# Patient Record
Sex: FEMALE | HISPANIC OR LATINO
[De-identification: names, ages, dates, MRNs, and addresses within clinical notes are randomized per-mention and may not be internally consistent; named-entity substitution may affect disease eponyms.]

---

## 2018-09-26 ENCOUNTER — CHARTING TRANS (OUTPATIENT)
Dept: OTHER | Age: 63
End: 2018-09-26

## 2018-10-02 ENCOUNTER — CHARTING TRANS (OUTPATIENT)
Dept: OTHER | Age: 63
End: 2018-10-02

## 2018-11-23 ENCOUNTER — IMAGING SERVICES (OUTPATIENT)
Dept: OTHER | Age: 63
End: 2018-11-23

## 2020-01-07 ENCOUNTER — OFFICE VISIT (OUTPATIENT)
Dept: NEUROLOGY | Facility: CLINIC | Age: 65
End: 2020-01-07
Payer: MEDICARE

## 2020-01-07 VITALS
WEIGHT: 111 LBS | SYSTOLIC BLOOD PRESSURE: 110 MMHG | RESPIRATION RATE: 16 BRPM | HEART RATE: 76 BPM | DIASTOLIC BLOOD PRESSURE: 72 MMHG | BODY MASS INDEX: 21 KG/M2

## 2020-01-07 DIAGNOSIS — R42 DIZZINESS: Primary | ICD-10-CM

## 2020-01-07 PROCEDURE — 99204 OFFICE O/P NEW MOD 45 MIN: CPT | Performed by: OTHER

## 2020-01-07 RX ORDER — MECLIZINE HCL 12.5 MG/1
12.5 TABLET ORAL 2 TIMES DAILY PRN
Qty: 40 TABLET | Refills: 0 | Status: SHIPPED | OUTPATIENT
Start: 2020-01-07

## 2020-01-07 NOTE — PROGRESS NOTES
[facility]    Date of consult: 1/7/2020    CC: paresthesia, dizziness    HPI: Rhys Henderson is a 75year old female with past medical history as listed below presents here for initial evaluation of dizziness and paresthesia.  Marie Rodriguez History   Problem Relation Age of Onset   • Cancer Father       Physical Examination:  /72   Pulse 76   Resp 16   Wt 111 lb (50.3 kg)   BMI 20.97 kg/m²   Lungs: clear to auscultation   CV: S1, S2+  Abdomen: soft, non tender, no masses  Extremities: n

## 2020-01-07 NOTE — PROGRESS NOTES
Patient here for evaluation of dizziness. Does also have nausea and blurred vision. Dizziness started approx 5-6 days ago.      [employer] phone  used: Lala President [phone]

## 2020-01-14 ENCOUNTER — APPOINTMENT (OUTPATIENT)
Dept: PHYSICAL THERAPY | Age: 65
End: 2020-01-14
Attending: Other
Payer: MEDICARE

## 2020-01-20 ENCOUNTER — APPOINTMENT (OUTPATIENT)
Dept: PHYSICAL THERAPY | Age: 65
End: 2020-01-20
Attending: Other
Payer: MEDICARE

## 2020-01-21 ENCOUNTER — APPOINTMENT (OUTPATIENT)
Dept: PHYSICAL THERAPY | Age: 65
End: 2020-01-21
Attending: Other
Payer: MEDICARE

## 2020-01-21 NOTE — TELEPHONE ENCOUNTER
Used [employer] for  on phone call. Jari Cooks BT#[id number]    Relayed below and to not drive to/from procedure. Verbalized understanding. Faxed Rx to pharmacy. Confirmation received.

## 2020-01-21 NOTE — TELEPHONE ENCOUNTER
Pt called, used ; pt states she was unable to complete MRI due to claustophobia; pt requesting MRI w/sedation, please call when pt is able to schedule

## 2020-01-31 ENCOUNTER — HOSPITAL ENCOUNTER (OUTPATIENT)
Dept: MRI IMAGING | Age: 65
Discharge: HOME | End: 2020-01-31
Attending: Other
Payer: MEDICARE

## 2020-01-31 PROCEDURE — A9575 INJ GADOTERATE MEGLUMI 0.1ML: HCPCS | Performed by: OTHER

## 2020-01-31 PROCEDURE — 70553 MRI BRAIN STEM W/O & W/DYE: CPT | Performed by: OTHER

## 2020-02-03 ENCOUNTER — TELEPHONE (OUTPATIENT)
Dept: NEUROLOGY | Facility: CLINIC | Age: 65
End: 2020-02-03

## 2020-02-03 NOTE — TELEPHONE ENCOUNTER
Patient notified of Pricila Ortega results and reminded of 2/14 deana't . Patient verbalized understanding.

## 2020-02-10 ENCOUNTER — IMAGING SERVICES (OUTPATIENT)
Dept: OTHER | Age: 65
End: 2020-02-10

## 2020-02-14 ENCOUNTER — OFFICE VISIT (OUTPATIENT)
Dept: NEUROLOGY | Facility: CLINIC | Age: 65
End: 2020-02-14
Payer: MEDICARE

## 2020-02-14 VITALS
WEIGHT: 110 LBS | RESPIRATION RATE: 16 BRPM | SYSTOLIC BLOOD PRESSURE: 102 MMHG | DIASTOLIC BLOOD PRESSURE: 70 MMHG | BODY MASS INDEX: 21 KG/M2 | HEART RATE: 74 BPM

## 2020-02-14 DIAGNOSIS — R42 DIZZINESS: Primary | ICD-10-CM

## 2020-02-14 PROCEDURE — 99214 OFFICE O/P EST MOD 30 MIN: CPT | Performed by: OTHER

## 2020-02-14 NOTE — PROGRESS NOTES
Pt following up for dizziness and to review test results. Pt states she doesn't really get dizzy anymore or have blurred vision.       ID:   [id number]

## 2020-02-14 NOTE — PROGRESS NOTES
[facility] Neurology outpatient progress note  Date of service: 2/14/2020    Patient here for a follow-up visit for dizziness. Since last visit she feels better, some mild discomfort in in right ear reported;   She is happy to know MRI brain is normal.  No other c • Fibromyalgia    • Gastritis      Past Surgical History:   Procedure Laterality Date   • LAPAROSCOPIC CHOLECYSTECTOMY N/A 5/25/2016    Performed by Bridgette Julio MD at [facility] MAIN OR   • OTHER      tummy tuck   • OTHER      implant in mouth   • OTHER      ri

## 2021-06-25 ENCOUNTER — IMAGING SERVICES (OUTPATIENT)
Dept: OTHER | Age: 66
End: 2021-06-25

## 2022-12-07 ENCOUNTER — WALK IN (OUTPATIENT)
Dept: URGENT CARE | Age: 67
End: 2022-12-07

## 2022-12-07 VITALS
HEART RATE: 83 BPM | DIASTOLIC BLOOD PRESSURE: 62 MMHG | OXYGEN SATURATION: 98 % | TEMPERATURE: 97.2 F | RESPIRATION RATE: 18 BRPM | SYSTOLIC BLOOD PRESSURE: 120 MMHG

## 2022-12-07 DIAGNOSIS — H61.21 IMPACTED CERUMEN OF RIGHT EAR: ICD-10-CM

## 2022-12-07 DIAGNOSIS — R42 VERTIGO: Primary | ICD-10-CM

## 2022-12-07 PROCEDURE — 69209 REMOVE IMPACTED EAR WAX UNI: CPT | Performed by: FAMILY MEDICINE

## 2022-12-07 PROCEDURE — 99203 OFFICE O/P NEW LOW 30 MIN: CPT | Performed by: FAMILY MEDICINE

## 2022-12-07 RX ORDER — MECLIZINE HYDROCHLORIDE 25 MG/1
25 TABLET ORAL 3 TIMES DAILY PRN
Qty: 30 TABLET | Refills: 0 | Status: SHIPPED | OUTPATIENT
Start: 2022-12-07

## 2023-05-27 ENCOUNTER — APPOINTMENT (OUTPATIENT)
Dept: ULTRASOUND IMAGING | Age: 68
End: 2023-05-27
Attending: INTERNAL MEDICINE

## 2023-07-27 ENCOUNTER — OFFICE VISIT (OUTPATIENT)
Dept: GASTROENTEROLOGY | Age: 68
End: 2023-07-27

## 2023-07-27 ENCOUNTER — E-ADVICE (OUTPATIENT)
Dept: GASTROENTEROLOGY | Age: 68
End: 2023-07-27

## 2023-07-27 VITALS — WEIGHT: 106.7 LBS | BODY MASS INDEX: 20.15 KG/M2 | HEIGHT: 61 IN

## 2023-07-27 DIAGNOSIS — R10.9 ABDOMINAL PAIN, UNSPECIFIED ABDOMINAL LOCATION: ICD-10-CM

## 2023-07-27 DIAGNOSIS — K21.9 GERD WITHOUT ESOPHAGITIS: Primary | ICD-10-CM

## 2023-07-27 PROCEDURE — 99204 OFFICE O/P NEW MOD 45 MIN: CPT

## 2023-07-27 RX ORDER — FAMOTIDINE 40 MG/1
40 TABLET, FILM COATED ORAL 2 TIMES DAILY
Qty: 180 TABLET | Refills: 3 | Status: SHIPPED | OUTPATIENT
Start: 2023-07-27 | End: 2023-10-25

## 2023-08-15 ENCOUNTER — ANESTHESIA EVENT (OUTPATIENT)
Dept: GASTROENTEROLOGY | Age: 68
End: 2023-08-15

## 2023-08-15 ENCOUNTER — HOSPITAL ENCOUNTER (OUTPATIENT)
Dept: GASTROENTEROLOGY | Age: 68
Discharge: HOME | End: 2023-08-15
Attending: INTERNAL MEDICINE

## 2023-08-15 ENCOUNTER — ANESTHESIA (OUTPATIENT)
Dept: GASTROENTEROLOGY | Age: 68
End: 2023-08-15

## 2023-08-15 VITALS
OXYGEN SATURATION: 98 % | HEIGHT: 61 IN | BODY MASS INDEX: 20.58 KG/M2 | HEART RATE: 54 BPM | SYSTOLIC BLOOD PRESSURE: 126 MMHG | DIASTOLIC BLOOD PRESSURE: 86 MMHG | RESPIRATION RATE: 18 BRPM | WEIGHT: 109 LBS | TEMPERATURE: 97.5 F

## 2023-08-15 DIAGNOSIS — K29.70 GASTRITIS WITHOUT BLEEDING, UNSPECIFIED CHRONICITY, UNSPECIFIED GASTRITIS TYPE: ICD-10-CM

## 2023-08-15 DIAGNOSIS — K21.9 GERD WITHOUT ESOPHAGITIS: ICD-10-CM

## 2023-08-15 DIAGNOSIS — R10.9 ABDOMINAL PAIN, UNSPECIFIED ABDOMINAL LOCATION: ICD-10-CM

## 2023-08-15 PROCEDURE — 88305 TISSUE EXAM BY PATHOLOGIST: CPT | Performed by: INTERNAL MEDICINE

## 2023-08-15 PROCEDURE — 43239 EGD BIOPSY SINGLE/MULTIPLE: CPT | Performed by: INTERNAL MEDICINE

## 2023-08-15 PROCEDURE — 43239 EGD BIOPSY SINGLE/MULTIPLE: CPT | Performed by: CLINIC/CENTER

## 2023-08-15 RX ORDER — 0.9 % SODIUM CHLORIDE 0.9 %
2 VIAL (ML) INJECTION EVERY 12 HOURS SCHEDULED
Status: DISCONTINUED | OUTPATIENT
Start: 2023-08-15 | End: 2023-08-17 | Stop reason: HOSPADM

## 2023-08-15 RX ORDER — SODIUM CHLORIDE 9 MG/ML
INJECTION, SOLUTION INTRAVENOUS CONTINUOUS
Status: DISCONTINUED | OUTPATIENT
Start: 2023-08-15 | End: 2023-08-17 | Stop reason: HOSPADM

## 2023-08-15 RX ORDER — DEXTROSE MONOHYDRATE 50 MG/ML
INJECTION, SOLUTION INTRAVENOUS CONTINUOUS PRN
Status: DISCONTINUED | OUTPATIENT
Start: 2023-08-15 | End: 2023-08-17 | Stop reason: HOSPADM

## 2023-08-15 RX ORDER — PROPOFOL 10 MG/ML
INJECTION, EMULSION INTRAVENOUS PRN
Status: DISCONTINUED | OUTPATIENT
Start: 2023-08-15 | End: 2023-08-15

## 2023-08-15 RX ORDER — DEXTROSE MONOHYDRATE 25 G/50ML
25 INJECTION, SOLUTION INTRAVENOUS PRN
Status: DISCONTINUED | OUTPATIENT
Start: 2023-08-15 | End: 2023-08-17 | Stop reason: HOSPADM

## 2023-08-15 RX ORDER — LIDOCAINE HYDROCHLORIDE 10 MG/ML
INJECTION, SOLUTION INFILTRATION; PERINEURAL PRN
Status: DISCONTINUED | OUTPATIENT
Start: 2023-08-15 | End: 2023-08-15

## 2023-08-15 RX ORDER — SODIUM CHLORIDE, SODIUM LACTATE, POTASSIUM CHLORIDE, CALCIUM CHLORIDE 600; 310; 30; 20 MG/100ML; MG/100ML; MG/100ML; MG/100ML
INJECTION, SOLUTION INTRAVENOUS CONTINUOUS
Status: DISCONTINUED | OUTPATIENT
Start: 2023-08-15 | End: 2023-08-17 | Stop reason: HOSPADM

## 2023-08-15 RX ORDER — NICOTINE POLACRILEX 4 MG
30 LOZENGE BUCCAL
Status: DISCONTINUED | OUTPATIENT
Start: 2023-08-15 | End: 2023-08-17 | Stop reason: HOSPADM

## 2023-08-15 RX ORDER — HUMAN INSULIN 100 [IU]/ML
INJECTION, SOLUTION SUBCUTANEOUS
Status: DISCONTINUED | OUTPATIENT
Start: 2023-08-15 | End: 2023-08-17 | Stop reason: HOSPADM

## 2023-08-15 RX ADMIN — PROPOFOL 20 MG: 10 INJECTION, EMULSION INTRAVENOUS at 13:44

## 2023-08-15 RX ADMIN — PROPOFOL 30 MG: 10 INJECTION, EMULSION INTRAVENOUS at 13:40

## 2023-08-15 RX ADMIN — PROPOFOL 20 MG: 10 INJECTION, EMULSION INTRAVENOUS at 13:42

## 2023-08-15 RX ADMIN — LIDOCAINE HYDROCHLORIDE 30 MG: 10 INJECTION, SOLUTION INFILTRATION; PERINEURAL at 13:38

## 2023-08-15 RX ADMIN — SODIUM CHLORIDE, SODIUM LACTATE, POTASSIUM CHLORIDE, CALCIUM CHLORIDE: 600; 310; 30; 20 INJECTION, SOLUTION INTRAVENOUS at 12:57

## 2023-08-15 RX ADMIN — PROPOFOL 70 MG: 10 INJECTION, EMULSION INTRAVENOUS at 13:38

## 2023-08-15 SDOH — SOCIAL STABILITY: SOCIAL INSECURITY: RISK FACTORS: AGE

## 2023-08-15 ASSESSMENT — ACTIVITIES OF DAILY LIVING (ADL)
ADL_BEFORE_ADMISSION: INDEPENDENT
ADL_SCORE: 12

## 2023-08-15 ASSESSMENT — PAIN SCALES - GENERAL
PAINLEVEL_OUTOF10: 1
PAINLEVEL_OUTOF10: 0

## 2023-08-17 ENCOUNTER — TELEPHONE (OUTPATIENT)
Dept: GASTROENTEROLOGY | Age: 68
End: 2023-08-17

## 2023-08-17 ENCOUNTER — CLINICAL DOCUMENTATION (OUTPATIENT)
Dept: GASTROENTEROLOGY | Age: 68
End: 2023-08-17

## 2023-08-17 LAB
ASR DISCLAIMER: NORMAL
CASE RPRT: NORMAL
CLINICAL INFO: NORMAL
PATH REPORT.FINAL DX SPEC: NORMAL
PATH REPORT.GROSS SPEC: NORMAL

## 2023-09-20 ENCOUNTER — WALK IN (OUTPATIENT)
Dept: URGENT CARE | Age: 68
End: 2023-09-20

## 2023-09-20 VITALS
RESPIRATION RATE: 18 BRPM | DIASTOLIC BLOOD PRESSURE: 76 MMHG | OXYGEN SATURATION: 100 % | SYSTOLIC BLOOD PRESSURE: 112 MMHG | TEMPERATURE: 97.4 F | HEART RATE: 84 BPM

## 2023-09-20 DIAGNOSIS — M43.6 TORTICOLLIS, ACUTE: ICD-10-CM

## 2023-09-20 DIAGNOSIS — K21.9 GASTROESOPHAGEAL REFLUX DISEASE, UNSPECIFIED WHETHER ESOPHAGITIS PRESENT: Primary | ICD-10-CM

## 2023-09-20 DIAGNOSIS — K21.9 GERD WITHOUT ESOPHAGITIS: ICD-10-CM

## 2023-09-20 PROCEDURE — 99215 OFFICE O/P EST HI 40 MIN: CPT | Performed by: EMERGENCY MEDICINE

## 2023-09-20 PROCEDURE — 93000 ELECTROCARDIOGRAM COMPLETE: CPT | Performed by: EMERGENCY MEDICINE

## 2023-09-20 RX ORDER — PREDNISONE 20 MG/1
40 TABLET ORAL DAILY
Qty: 10 TABLET | Refills: 0 | Status: SHIPPED | OUTPATIENT
Start: 2023-09-20 | End: 2023-09-25

## 2023-09-20 RX ORDER — OMEPRAZOLE 20 MG/1
20 CAPSULE, DELAYED RELEASE ORAL DAILY
Qty: 90 CAPSULE | Refills: 3 | Status: SHIPPED | OUTPATIENT
Start: 2023-09-20

## 2023-10-31 ENCOUNTER — OFFICE VISIT (OUTPATIENT)
Facility: LOCATION | Age: 68
End: 2023-10-31

## 2023-10-31 DIAGNOSIS — H93.13 TINNITUS OF BOTH EARS: ICD-10-CM

## 2023-10-31 DIAGNOSIS — J34.89 NASAL VESTIBULITIS: ICD-10-CM

## 2023-10-31 DIAGNOSIS — L30.9 DERMATITIS OF EXTERNAL EAR: Primary | ICD-10-CM

## 2023-10-31 RX ORDER — FLUOCINOLONE ACETONIDE 0.11 MG/ML
3 OIL AURICULAR (OTIC) 3 TIMES DAILY
Qty: 20 ML | Refills: 3 | Status: SHIPPED | OUTPATIENT
Start: 2023-10-31 | End: 2023-11-07

## 2023-10-31 NOTE — PROGRESS NOTES
Sofi Haider is a 77year old female. No chief complaint on file. HPI:   She complains of itching in her ears. She also gets tinnitus. She also has a history of dryness in the nose. Current Outpatient Medications   Medication Sig Dispense Refill    Fluocinolone Acetonide 0.01 % Otic Oil Place 3 drops in ear(s) 3 (three) times daily for 7 days. Into affected ear 20 mL 3    LORazepam 1 MG Oral Tab Take 1 tablet (1mg) 1 hour prior to MRI and may repeat once right before MRI if needed. NO DRIVING WHILE TAKING THIS MEDICATION. 2 tablet 0    Meclizine HCl 12.5 MG Oral Tab Take 1 tablet (12.5 mg total) by mouth 2 (two) times daily as needed for Dizziness. 40 tablet 0    omeprazole 20 MG Oral Capsule Delayed Release Take 20 mg by mouth every morning before breakfast.      Pantoprazole Sodium 40 MG Oral Tab EC Take 40 mg by mouth 2 (two) times daily before meals. Atorvastatin Calcium 20 MG Oral Tab Take 20 mg by mouth nightly. Budesonide-Formoterol Fumarate 160-4.5 MCG/ACT Inhalation Aerosol Inhale 1 puff into the lungs daily. Albuterol Sulfate  (90 BASE) MCG/ACT Inhalation Aero Soln Inhale 2 puffs into the lungs every 6 (six) hours as needed for Wheezing.         Past Medical History:   Diagnosis Date    Asthma     Depression     Fibromyalgia     Gastritis       Social History:  Social History     Socioeconomic History    Marital status: Legally    Tobacco Use    Smoking status: Never    Smokeless tobacco: Never   Vaping Use    Vaping Use: Never used   Substance and Sexual Activity    Alcohol use: Yes     Comment: socially    Drug use: Not Currently   Other Topics Concern    Caffeine Concern Yes     Comment: coffee/tea 2 per day    Exercise Yes     Comment: walking, stretching daily      Past Surgical History:   Procedure Laterality Date    LAPAROSCOPIC CHOLECYSTECTOMY N/A 5/25/2016    Procedure: Talib Magaña;  Surgeon: Eddie Glasgow MD;  Location: [facility] MAIN OR    OTHER tummy tuck    OTHER      implant in mouth    OTHER      right ear surgery    TUBAL LIGATION           REVIEW OF SYSTEMS:   GENERAL HEALTH: feels well otherwise  GENERAL : denies fever, chills, sweats, weight loss, weight gain  SKIN: denies any unusual skin lesions or rashes  RESPIRATORY: denies shortness of breath with exertion  NEURO: denies headaches    EXAM:   There were no vitals taken for this visit. System Findings Details   Constitutional  Overall appearance - Normal.   Psychiatric  Orientation - Oriented to time, place, person & situation. Appropriate mood and affect. Head/Face  Facial features -- Normal. Skull - Normal.   Eyes  Pupils equal ,round ,react to light and accomidate   Ears, Nose, Throat, Neck  Dermatitis of the ears bilaterally cleaned out today under the microscope nose reveals some mild nasal vestibulitis oropharynx clear neck no masses   Neurological  Memory - Normal. Cranial nerves - Cranial nerves II through XII grossly intact. Lymph Detail  Submental. Submandibular. Anterior cervical. Posterior cervical. Supraclavicular. ASSESSMENT AND PLAN:   1. Dermatitis of external ear  She will try Derm otic drops as needed for itching. 2. Nasal vestibulitis  She will try saline nasal gel as needed. 3. Tinnitus of both ears  She will see me back with a dedicated audiogram.      The patient indicates understanding of these issues and agrees to the plan. No follow-ups on file.     Zohra Allison MD  10/31/2023  4:56 PM

## 2023-11-08 PROBLEM — K44.9 HIATAL HERNIA: Status: ACTIVE | Noted: 2018-09-25

## 2023-11-08 PROBLEM — R31.29 OTHER MICROSCOPIC HEMATURIA: Status: ACTIVE | Noted: 2018-01-09

## 2023-11-08 PROBLEM — Z12.39 OTHER SCREENING BREAST EXAMINATION: Status: ACTIVE | Noted: 2017-06-15

## 2023-11-08 PROBLEM — K58.9 IBS (IRRITABLE BOWEL SYNDROME): Status: ACTIVE | Noted: 2022-02-24

## 2023-11-08 PROBLEM — M79.672 PAIN OF LEFT HEEL: Status: ACTIVE | Noted: 2022-08-01

## 2023-11-08 PROBLEM — R10.31 ABDOMINAL PAIN, RIGHT LOWER QUADRANT: Status: ACTIVE | Noted: 2018-09-11

## 2023-11-08 PROBLEM — H91.90 DECREASED HEARING: Status: ACTIVE | Noted: 2019-08-09

## 2023-11-08 PROBLEM — R23.3 EASY BRUISING: Status: ACTIVE | Noted: 2017-12-01

## 2023-11-08 PROBLEM — H57.9 EYE SYMPTOMS: Status: ACTIVE | Noted: 2019-12-05

## 2023-11-08 PROBLEM — Z00.00 HEALTHCARE MAINTENANCE: Status: ACTIVE | Noted: 2021-10-13

## 2023-11-08 PROBLEM — R60.0 PEDAL EDEMA: Status: ACTIVE | Noted: 2021-07-19

## 2023-11-08 PROBLEM — M62.830 SPASM OF BACK MUSCLES: Status: ACTIVE | Noted: 2022-05-04

## 2023-11-08 PROBLEM — R92.8 ABNORMAL MAMMOGRAM: Status: ACTIVE | Noted: 2022-09-16

## 2023-11-08 PROBLEM — E78.5 DYSLIPIDEMIA: Status: ACTIVE | Noted: 2017-06-15

## 2023-11-08 PROBLEM — M54.50 LOW BACK PAIN: Status: ACTIVE | Noted: 2017-06-09

## 2023-11-08 PROBLEM — E78.00 HYPERCHOLESTEREMIA: Status: ACTIVE | Noted: 2019-05-23

## 2023-11-08 PROBLEM — K21.9 GERD (GASTROESOPHAGEAL REFLUX DISEASE): Status: ACTIVE | Noted: 2019-05-23

## 2023-11-08 PROBLEM — F41.0 PANIC DISORDER: Status: ACTIVE | Noted: 2019-11-07

## 2023-11-08 PROBLEM — H91.90 HEARING LOSS: Status: ACTIVE | Noted: 2017-05-25

## 2023-11-08 PROBLEM — G62.9 NEUROPATHY: Status: ACTIVE | Noted: 2018-11-08

## 2023-11-08 PROBLEM — R53.81 MALAISE AND FATIGUE: Status: ACTIVE | Noted: 2017-06-09

## 2023-11-08 PROBLEM — M54.2 NECK PAIN: Status: ACTIVE | Noted: 2021-07-02

## 2023-11-08 PROBLEM — Z12.31 OTHER SCREENING MAMMOGRAM: Status: ACTIVE | Noted: 2022-08-19

## 2023-11-08 PROBLEM — I73.9 PERIPHERAL VASCULAR DISEASE (CMD): Status: ACTIVE | Noted: 2017-06-05

## 2023-11-08 PROBLEM — R10.2 PELVIC PAIN: Status: ACTIVE | Noted: 2022-08-19

## 2023-11-08 PROBLEM — R73.02 IMPAIRED GLUCOSE TOLERANCE: Status: ACTIVE | Noted: 2022-09-02

## 2023-11-08 PROBLEM — H81.10 BENIGN PAROXYSMAL POSITIONAL VERTIGO: Status: ACTIVE | Noted: 2019-03-22

## 2023-11-08 PROBLEM — M19.049 ARTHRITIS OF HAND: Status: ACTIVE | Noted: 2022-04-06

## 2023-11-08 PROBLEM — K57.90 DIVERTICULOSIS: Status: ACTIVE | Noted: 2018-09-25

## 2023-11-08 PROBLEM — R05.9 COUGH: Status: ACTIVE | Noted: 2018-06-08

## 2023-11-08 PROBLEM — R00.2 PALPITATIONS: Status: ACTIVE | Noted: 2018-05-04

## 2023-11-08 PROBLEM — M79.89 SOFT TISSUE MASS: Status: ACTIVE | Noted: 2019-07-16

## 2023-11-08 PROBLEM — M79.605 LEG PAIN, LEFT: Status: ACTIVE | Noted: 2018-11-29

## 2023-11-08 PROBLEM — M17.9 OSTEOARTHRITIS OF KNEE: Status: ACTIVE | Noted: 2017-04-27

## 2023-11-08 PROBLEM — M79.2 NEUROPATHIC PAIN: Status: ACTIVE | Noted: 2022-07-12

## 2023-11-08 PROBLEM — N63.10 BREAST MASS, RIGHT: Status: ACTIVE | Noted: 2017-11-07

## 2023-11-08 PROBLEM — E78.49 OTHER HYPERLIPIDEMIA: Status: ACTIVE | Noted: 2023-11-08

## 2023-11-08 PROBLEM — R10.13 ABDOMINAL PAIN, EPIGASTRIC: Status: ACTIVE | Noted: 2021-12-27

## 2023-11-08 PROBLEM — R53.83 MALAISE AND FATIGUE: Status: ACTIVE | Noted: 2017-06-09

## 2023-11-08 PROBLEM — K30 NUD (NONULCER DYSPEPSIA): Status: ACTIVE | Noted: 2022-02-24

## 2023-11-08 PROBLEM — R31.9 HEMATURIA: Status: ACTIVE | Noted: 2018-09-11

## 2023-11-11 ENCOUNTER — WALK IN (OUTPATIENT)
Dept: URGENT CARE | Age: 68
End: 2023-11-11

## 2023-11-11 VITALS
SYSTOLIC BLOOD PRESSURE: 126 MMHG | DIASTOLIC BLOOD PRESSURE: 86 MMHG | TEMPERATURE: 97.8 F | RESPIRATION RATE: 18 BRPM | OXYGEN SATURATION: 98 % | HEART RATE: 72 BPM

## 2023-11-11 DIAGNOSIS — R07.9 CHEST PAIN, UNSPECIFIED TYPE: Primary | ICD-10-CM

## 2023-11-11 PROCEDURE — 99203 OFFICE O/P NEW LOW 30 MIN: CPT | Performed by: FAMILY MEDICINE

## 2023-11-11 PROCEDURE — 93000 ELECTROCARDIOGRAM COMPLETE: CPT | Performed by: FAMILY MEDICINE

## 2023-11-12 PROCEDURE — 99223 1ST HOSP IP/OBS HIGH 75: CPT | Performed by: HOSPITALIST

## 2023-11-13 ENCOUNTER — TELEPHONE (OUTPATIENT)
Dept: INTERNAL MEDICINE | Age: 68
End: 2023-11-13

## 2023-11-14 PROBLEM — F41.9 ANXIETY DISORDER: Status: ACTIVE | Noted: 2023-11-14

## 2023-12-04 PROBLEM — R42 DIZZINESS: Status: ACTIVE | Noted: 2020-01-03

## 2023-12-04 PROBLEM — Z00.00 WELL ADULT EXAM: Status: ACTIVE | Noted: 2021-03-15

## 2023-12-04 PROBLEM — Z13.820 SCREENING FOR OSTEOPOROSIS: Status: ACTIVE | Noted: 2019-04-23

## 2023-12-04 PROBLEM — Z01.419 ROUTINE GYNECOLOGICAL EXAMINATION: Status: ACTIVE | Noted: 2022-08-19

## 2023-12-04 PROBLEM — Z76.89 PERSONS ENCOUNTERING HEALTH SERVICES IN OTHER SPECIFIED CIRCUMSTANCES: Status: ACTIVE | Noted: 2021-03-26

## 2023-12-04 PROBLEM — H43.391 VITREOUS FLOATERS, RIGHT: Status: ACTIVE | Noted: 2018-03-06

## 2023-12-04 PROBLEM — R53.1 WEAKNESS: Status: ACTIVE | Noted: 2019-03-12

## 2023-12-04 PROBLEM — R21 RASH: Status: ACTIVE | Noted: 2019-08-16

## 2023-12-04 PROBLEM — R07.89 OTHER CHEST PAIN: Status: ACTIVE | Noted: 2023-11-11

## 2023-12-04 PROBLEM — H53.9 VISUAL DISTURBANCE: Status: ACTIVE | Noted: 2018-02-20

## 2023-12-04 PROBLEM — R42 VERTIGO: Status: ACTIVE | Noted: 2022-12-10

## 2023-12-04 PROBLEM — R53.83 FATIGUE: Status: ACTIVE | Noted: 2018-03-27

## 2023-12-07 ENCOUNTER — OFFICE VISIT (OUTPATIENT)
Facility: LOCATION | Age: 68
End: 2023-12-07
Payer: MEDICARE

## 2023-12-07 DIAGNOSIS — L30.9 DERMATITIS OF EXTERNAL EAR: Primary | ICD-10-CM

## 2023-12-07 DIAGNOSIS — H93.13 TINNITUS OF BOTH EARS: ICD-10-CM

## 2023-12-07 DIAGNOSIS — H90.71 MIXED CONDUCTIVE AND SENSORINEURAL HEARING LOSS OF RIGHT EAR WITH UNRESTRICTED HEARING OF LEFT EAR: Primary | ICD-10-CM

## 2023-12-07 PROCEDURE — 1160F RVW MEDS BY RX/DR IN RCRD: CPT | Performed by: OTOLARYNGOLOGY

## 2023-12-07 PROCEDURE — 99213 OFFICE O/P EST LOW 20 MIN: CPT | Performed by: OTOLARYNGOLOGY

## 2023-12-07 PROCEDURE — 1159F MED LIST DOCD IN RCRD: CPT | Performed by: OTOLARYNGOLOGY

## 2023-12-07 NOTE — PROGRESS NOTES
Arnaldo Rust was seen for an audiometric evaluation and tympanogram today. Referred back to physician. Jacque Gamboa M.A.  ADÁNA

## 2023-12-07 NOTE — PROGRESS NOTES
Javed Ortiz is a 77year old female. Chief Complaint   Patient presents with    Cerumen Impaction    Dizziness    Hearing Check     HPI:   She has a history of ear surgery on her right ear over 30 years ago. She has decreased hearing in that ear. She been having some itching in the ears but it has improved using the drops. REVIEW OF SYSTEMS:   GENERAL HEALTH: feels well otherwise  GENERAL : denies fever, chills, sweats, weight loss, weight gain  SKIN: denies any unusual skin lesions or rashes  RESPIRATORY: denies shortness of breath with exertion  NEURO: denies headaches    EXAM:   There were no vitals taken for this visit. System Findings Details   Constitutional  Overall appearance - Normal.   Psychiatric  Orientation - Oriented to time, place, person & situation. Appropriate mood and affect. Head/Face  Facial features -- Normal. Skull - Normal.   Eyes  Pupils equal ,round ,react to light and accomidate   Ears, Nose, Throat, Neck  Some mild debris in the right ear cleaned out today. There is some scar tissue present from previous surgery left ear is clear   Neurological  Memory - Normal. Cranial nerves - Cranial nerves II through XII grossly intact. Lymph Detail  Submental. Submandibular. Anterior cervical. Posterior cervical. Supraclavicular. Latest Audiogram Result (Hz) Exam performed: 12/7/2023 2:13 PM Last edited by KINJAL Hernandez on 12/7/2023 3:09 PM        125 250  1500 2000 3000 4000 6000 8000    Right air:         70 50 50    Left air:  10 15  15  25  35 25 20    Right air (masked):  50 60  80  90 95       Left mastoid bone:   5  20  25  25      Right mastoid bone (masked):  10 35  25  40  30         Reliability:  Good    Transducer:   Inserts    Technique:  Conventional Audiometry    Comments:            Latest Speech Audiometry  Last edited by KINJAL Hernandez on 12/7/2023 3:08 PM       Ear Method PTA SAT SRT [facility] Test/list Score (%) Intensity Mask/noise Notes    right   70     84 100 70     left    15    92 55                    Latest Tympanogram Result       Probe Tone (Hz): Unknown Exam performed: 12/7/2023 2:13 PM Last edited by KINJAL Means on 12/7/2023 3:09 PM      Tympanograms  These were drawn by a user, not generated from device data      Right Ear Left Ear                     Right Ear Left Ear    Tympanogram type:  Type A    Canal volume (mL): 2.26 0.82    Peak pressure (daPa): -165 -19    Peak amplitude (mL): 0.52 0.8    Tympanogram width (daPa): Comments:                    Latest Audiogram and Tympanogram Result Text  Last edited by KINJAL Means on 12/7/2023  3:09 PM      Study Result                 Narrative & Impression  History:  Patient reports episodic dizziness with movement. Also reports aural pressure and tinnitus (right > left). Patient also reports longstanding hearing loss in the right ear. Audiogram:   Right ear: moderate to severe mixed hearing loss, 250-8000Hz. Left ear: normal hearing sloping to a mild loss in the high frequencies. Word recognition ability in quiet: was good for the right ear and excellent for the left ear. Tympanograms:  Right ear= negative middle ear pressure with normal tympanic membrane mobility. Left ear= normal middle ear status. RECS: Follow-up with referring ENT. Yeni Terry M.A., [facility] [phone]                   ASSESSMENT AND PLAN:   1. Dermatitis of external ear  Improved. She will use the drops as needed for itching. She will see me back in 3 months for repeat evaluation. 2. Tinnitus of both ears  She has a history of surgery on the right ear. There is some scar tissue. At any point she would be cleared for hearing aid. The patient indicates understanding of these issues and agrees to the plan. No follow-ups on file.     Don Rust MD  12/7/2023  5:47 PM

## 2023-12-14 PROBLEM — Z00.00 ROUTINE GENERAL MEDICAL EXAMINATION AT A HEALTH CARE FACILITY: Status: ACTIVE | Noted: 2023-12-14

## 2024-03-01 ENCOUNTER — APPOINTMENT (OUTPATIENT)
Dept: URGENT CARE | Age: 69
End: 2024-03-01

## 2024-04-01 ENCOUNTER — EXTERNAL RECORD (OUTPATIENT)
Dept: HEALTH INFORMATION MANAGEMENT | Facility: OTHER | Age: 69
End: 2024-04-01

## 2024-04-05 ENCOUNTER — OFFICE VISIT (OUTPATIENT)
Dept: DERMATOLOGY | Age: 69
End: 2024-04-05

## 2024-04-05 DIAGNOSIS — D18.01 CHERRY ANGIOMA: ICD-10-CM

## 2024-04-05 DIAGNOSIS — L82.0 INFLAMED SEBORRHEIC KERATOSIS: Primary | ICD-10-CM

## 2025-06-19 ENCOUNTER — APPOINTMENT (OUTPATIENT)
Dept: DERMATOLOGY | Age: 70
End: 2025-06-19

## 2025-06-19 DIAGNOSIS — L21.9 SEBORRHEIC DERMATITIS: ICD-10-CM

## 2025-06-19 DIAGNOSIS — L82.0 INFLAMED SEBORRHEIC KERATOSIS: Primary | ICD-10-CM

## 2025-06-19 RX ORDER — FLUOCINONIDE TOPICAL SOLUTION USP, 0.05% 0.5 MG/ML
SOLUTION TOPICAL
Qty: 60 ML | Refills: 1 | Status: SHIPPED | OUTPATIENT
Start: 2025-06-19

## 2025-06-19 RX ORDER — KETOCONAZOLE 20 MG/ML
SHAMPOO, SUSPENSION TOPICAL
Qty: 120 ML | Refills: 5 | Status: SHIPPED | OUTPATIENT
Start: 2025-06-19